# Patient Record
Sex: FEMALE | Race: WHITE | NOT HISPANIC OR LATINO | Employment: FULL TIME | URBAN - METROPOLITAN AREA
[De-identification: names, ages, dates, MRNs, and addresses within clinical notes are randomized per-mention and may not be internally consistent; named-entity substitution may affect disease eponyms.]

---

## 2018-01-09 ENCOUNTER — HOSPITAL ENCOUNTER (EMERGENCY)
Facility: HOSPITAL | Age: 33
End: 2018-01-09
Attending: EMERGENCY MEDICINE | Admitting: EMERGENCY MEDICINE
Payer: COMMERCIAL

## 2018-01-09 DIAGNOSIS — F32.A DEPRESSION: Primary | ICD-10-CM

## 2018-01-09 LAB
AMPHETAMINES SERPL QL SCN: NEGATIVE
ANION GAP SERPL CALCULATED.3IONS-SCNC: 5 MMOL/L (ref 4–13)
APAP SERPL-MCNC: <2 UG/ML (ref 10–30)
BARBITURATES UR QL: NEGATIVE
BASOPHILS # BLD AUTO: 0.05 THOUSANDS/ΜL (ref 0–0.1)
BASOPHILS NFR BLD AUTO: 1 % (ref 0–1)
BENZODIAZ UR QL: NEGATIVE
BUN SERPL-MCNC: 8 MG/DL (ref 5–25)
CALCIUM SERPL-MCNC: 8.9 MG/DL (ref 8.3–10.1)
CHLORIDE SERPL-SCNC: 106 MMOL/L (ref 100–108)
CO2 SERPL-SCNC: 30 MMOL/L (ref 21–32)
COCAINE UR QL: NEGATIVE
CREAT SERPL-MCNC: 0.65 MG/DL (ref 0.6–1.3)
EOSINOPHIL # BLD AUTO: 0.22 THOUSAND/ΜL (ref 0–0.61)
EOSINOPHIL NFR BLD AUTO: 3 % (ref 0–6)
ERYTHROCYTE [DISTWIDTH] IN BLOOD BY AUTOMATED COUNT: 12.9 % (ref 11.6–15.1)
ETHANOL SERPL-MCNC: <3 MG/DL (ref 0–3)
EXT PREG TEST URINE: NEGATIVE
GFR SERPL CREATININE-BSD FRML MDRD: 117 ML/MIN/1.73SQ M
GLUCOSE SERPL-MCNC: 86 MG/DL (ref 65–140)
HCT VFR BLD AUTO: 41 % (ref 34.8–46.1)
HGB BLD-MCNC: 13.7 G/DL (ref 11.5–15.4)
LYMPHOCYTES # BLD AUTO: 3.57 THOUSANDS/ΜL (ref 0.6–4.47)
LYMPHOCYTES NFR BLD AUTO: 41 % (ref 14–44)
MCH RBC QN AUTO: 27.5 PG (ref 26.8–34.3)
MCHC RBC AUTO-ENTMCNC: 33.4 G/DL (ref 31.4–37.4)
MCV RBC AUTO: 82 FL (ref 82–98)
METHADONE UR QL: NEGATIVE
MONOCYTES # BLD AUTO: 0.55 THOUSAND/ΜL (ref 0.17–1.22)
MONOCYTES NFR BLD AUTO: 6 % (ref 4–12)
NEUTROPHILS # BLD AUTO: 4.25 THOUSANDS/ΜL (ref 1.85–7.62)
NEUTS SEG NFR BLD AUTO: 49 % (ref 43–75)
OPIATES UR QL SCN: NEGATIVE
PCP UR QL: NEGATIVE
PLATELET # BLD AUTO: 411 THOUSANDS/UL (ref 149–390)
PMV BLD AUTO: 9.6 FL (ref 8.9–12.7)
POTASSIUM SERPL-SCNC: 4.5 MMOL/L (ref 3.5–5.3)
RBC # BLD AUTO: 4.98 MILLION/UL (ref 3.81–5.12)
SALICYLATES SERPL-MCNC: <3 MG/DL (ref 3–20)
SODIUM SERPL-SCNC: 141 MMOL/L (ref 136–145)
THC UR QL: NEGATIVE
WBC # BLD AUTO: 8.64 THOUSAND/UL (ref 4.31–10.16)

## 2018-01-09 PROCEDURE — 80320 DRUG SCREEN QUANTALCOHOLS: CPT | Performed by: PHYSICIAN ASSISTANT

## 2018-01-09 PROCEDURE — 36415 COLL VENOUS BLD VENIPUNCTURE: CPT | Performed by: PHYSICIAN ASSISTANT

## 2018-01-09 PROCEDURE — 80307 DRUG TEST PRSMV CHEM ANLYZR: CPT | Performed by: PHYSICIAN ASSISTANT

## 2018-01-09 PROCEDURE — 80048 BASIC METABOLIC PNL TOTAL CA: CPT | Performed by: PHYSICIAN ASSISTANT

## 2018-01-09 PROCEDURE — 99285 EMERGENCY DEPT VISIT HI MDM: CPT

## 2018-01-09 PROCEDURE — 85025 COMPLETE CBC W/AUTO DIFF WBC: CPT | Performed by: PHYSICIAN ASSISTANT

## 2018-01-09 PROCEDURE — 81025 URINE PREGNANCY TEST: CPT | Performed by: PHYSICIAN ASSISTANT

## 2018-01-09 PROCEDURE — 80329 ANALGESICS NON-OPIOID 1 OR 2: CPT | Performed by: PHYSICIAN ASSISTANT

## 2018-01-09 RX ORDER — CITALOPRAM 40 MG/1
60 TABLET ORAL DAILY
COMMUNITY
End: 2021-08-17 | Stop reason: ALTCHOICE

## 2018-01-09 NOTE — ED PROVIDER NOTES
History  Chief Complaint   Patient presents with    Psychiatric Evaluation     Patient presents with increased depression since the holidays  Pt states she just wants to sleep and not do anything  Patient states she sleeps all the time and can't eat  pt did not go to work yesterday or today  Pt states she doesn't have suicidal ideations but doesn't trust herself  Pt lives with her  and three children  He states she has to do everything at home and works full time  Pt has never had  an admission for psych     35 yo female presents for evaluation of depression  Pt has long history of such  Per mom, depression has been present since she was 23 yo  She has had multiple therapists in the past  She has never been hospitalized for the depression  No suicidal ideation, no history of such  No homicidal ideation  Denies delusions or hallucinations  Denies IV drug abuse or alcohol abuse  Currently states she just doesn't want to do anything  She does not want to eat, go to the bathroom, nothing  Prior to Admission Medications   Prescriptions Last Dose Informant Patient Reported? Taking?   citalopram (CeleXA) 40 mg tablet   Yes Yes   Sig: Take 60 mg by mouth daily      Facility-Administered Medications: None       Past Medical History:   Diagnosis Date    Psychiatric disorder     depression       History reviewed  No pertinent surgical history  History reviewed  No pertinent family history  I have reviewed and agree with the history as documented  Social History   Substance Use Topics    Smoking status: Never Smoker    Smokeless tobacco: Never Used    Alcohol use Yes        Review of Systems   Constitutional: Negative for activity change, appetite change, chills, fatigue and fever  HENT: Negative for congestion, postnasal drip, rhinorrhea, sore throat, trouble swallowing and voice change  Eyes: Negative for photophobia and visual disturbance     Respiratory: Negative for cough and shortness of breath  Cardiovascular: Negative for chest pain  Gastrointestinal: Negative for abdominal pain, constipation, diarrhea, nausea and vomiting  Endocrine: Negative for cold intolerance and heat intolerance  Genitourinary: Negative for dysuria, flank pain, frequency, hematuria and urgency  Musculoskeletal: Negative for back pain, gait problem, neck pain and neck stiffness  Skin: Negative for rash and wound  Allergic/Immunologic: Negative for food allergies  Neurological: Negative for dizziness, weakness, light-headedness, numbness and headaches  Hematological: Negative for adenopathy  Psychiatric/Behavioral: Negative for agitation, behavioral problems, confusion, hallucinations, self-injury, sleep disturbance and suicidal ideas  The patient is not nervous/anxious  Physical Exam  ED Triage Vitals   Temperature Pulse Respirations Blood Pressure SpO2   01/09/18 1600 01/09/18 1605 01/09/18 1600 01/09/18 1605 01/09/18 1605   97 6 °F (36 4 °C) 97 20 143/90 98 %      Temp Source Heart Rate Source Patient Position - Orthostatic VS BP Location FiO2 (%)   01/09/18 2345 01/09/18 2345 -- 01/09/18 1605 --   Temporal Monitor  Right arm       Pain Score       --                  Orthostatic Vital Signs  Vitals:    01/09/18 1605 01/09/18 2204 01/09/18 2345   BP: 143/90 126/76    Pulse: 97 74 76       Physical Exam   Constitutional: She is oriented to person, place, and time  She appears well-developed and well-nourished  No distress  HENT:   Head: Normocephalic and atraumatic  Eyes: Conjunctivae and EOM are normal  Pupils are equal, round, and reactive to light  Neck: Normal range of motion  Neck supple  Cardiovascular: Normal rate, regular rhythm, normal heart sounds and intact distal pulses  Exam reveals no gallop and no friction rub  No murmur heard  Pulmonary/Chest: Effort normal and breath sounds normal  No respiratory distress  She has no wheezes  She has no rales  Musculoskeletal: Normal range of motion  She exhibits no edema, tenderness or deformity  Neurological: She is alert and oriented to person, place, and time  Skin: Skin is warm and dry  She is not diaphoretic  No pallor  Psychiatric: Judgment and thought content normal  Her speech is delayed  She is withdrawn  She is not actively hallucinating  Thought content is not paranoid and not delusional  Cognition and memory are normal  She exhibits a depressed mood  She expresses no homicidal and no suicidal ideation  She expresses no suicidal plans and no homicidal plans  Vitals reviewed  ED Medications  Medications - No data to display    Diagnostic Studies  Results Reviewed     Procedure Component Value Units Date/Time    Rapid drug screen, urine [[id number]]  (Normal) Collected:  01/09/18 1927    Lab Status:  Final result Specimen:  Urine from Urine, Clean Catch Updated:  01/09/18 2005     Amph/Meth UR Negative     Barbiturate Ur Negative     Benzodiazepine Urine Negative     Cocaine Urine Negative     Methadone Urine Negative     Opiate Urine Negative     PCP Ur Negative     THC Urine Negative    Narrative:         FOR MEDICAL PURPOSES ONLY  IF CONFIRMATION NEEDED PLEASE CONTACT THE LAB WITHIN 5 DAYS      Drug Screen Cutoff Levels:  AMPHETAMINE/METHAMPHETAMINES  1000 ng/mL  BARBITURATES     200 ng/mL  BENZODIAZEPINES     200 ng/mL  COCAINE      300 ng/mL  METHADONE      300 ng/mL  OPIATES      300 ng/mL  PHENCYCLIDINE     25 ng/mL  THC       50 ng/mL    POCT pregnancy, urine [[id number]]  (Normal) Resulted:  01/09/18 1930    Lab Status:  Final result Updated:  01/09/18 1930     EXT PREG TEST UR (Ref: Negative) NEGATIVE    Ethanol [[id number]]  (Normal) Collected:  01/09/18 1718    Lab Status:  Final result Specimen:  Blood from Arm, Right Updated:  01/09/18 1756     Ethanol Lvl <3 mg/dL     Acetaminophen level [[id number]]  (Abnormal) Collected:  01/09/18 1718    Lab Status:  Final result Specimen:  Blood from Arm, Right Updated:  01/09/18 1754     Acetaminophen Level <2 (L) ug/mL     Basic metabolic panel [[id number]] Collected:  01/09/18 1718    Lab Status:  Final result Specimen:  Blood from Arm, Right Updated:  01/09/18 1750     Sodium 141 mmol/L      Potassium 4 5 mmol/L      Chloride 106 mmol/L      CO2 30 mmol/L      Anion Gap 5 mmol/L      BUN 8 mg/dL      Creatinine 0 65 mg/dL      Glucose 86 mg/dL      Calcium 8 9 mg/dL      eGFR 117 ml/min/1 73sq m     Narrative:         National Kidney Disease Education Program recommendations are as follows:  GFR calculation is accurate only with a steady state creatinine  Chronic Kidney disease less than 60 ml/min/1 73 sq  meters  Kidney failure less than 15 ml/min/1 73 sq  meters  Salicylate level [[id number]]  (Abnormal) Collected:  01/09/18 1718    Lab Status:  Final result Specimen:  Blood from Arm, Right Updated:  82/92/03 7325     Salicylate Lvl <3 (L) mg/dL     CBC and differential [[id number]]  (Abnormal) Collected:  01/09/18 1718    Lab Status:  Final result Specimen:  Blood from Arm, Right Updated:  01/09/18 1737     WBC 8 64 Thousand/uL      RBC 4 98 Million/uL      Hemoglobin 13 7 g/dL      Hematocrit 41 0 %      MCV 82 fL      MCH 27 5 pg      MCHC 33 4 g/dL      RDW 12 9 %      MPV 9 6 fL      Platelets 336 (H) Thousands/uL      Neutrophils Relative 49 %      Lymphocytes Relative 41 %      Monocytes Relative 6 %      Eosinophils Relative 3 %      Basophils Relative 1 %      Neutrophils Absolute 4 25 Thousands/µL      Lymphocytes Absolute 3 57 Thousands/µL      Monocytes Absolute 0 55 Thousand/µL      Eosinophils Absolute 0 22 Thousand/µL      Basophils Absolute 0 05 Thousands/µL                  No orders to display              Procedures  Procedures       Phone Contacts  ED Phone Contact    ED Course  ED Course as of Jan 11 1209   Tue Jan 09, 2018   1853 Crisis called   To evaluate pt    2128 Pt signed 201                                MDM  Number of Diagnoses or Management Options  Depression:   Diagnosis management comments: Pt here for severe depression  No SI, HI  Labs obtained  Crisis paged  Pt to sign 201  Pending transfer to [facility] Sheron Ortega  Pt signed out to Dr Tawana Funk at 22:00 on 1/9/18  Amount and/or Complexity of Data Reviewed  Clinical lab tests: ordered and reviewed      CritCare Time    Disposition  Final diagnoses:   Depression     Time reflects when diagnosis was documented in both MDM as applicable and the Disposition within this note     Time User Action Codes Description Comment    1/9/2018 10:02 PM Wilder Jiménez Add [F32 9] Depression       ED Disposition     ED Disposition Condition Comment    Transfer to Another [facility] 69 should be transferred out to [facility] Sheron Ortega MD Documentation    Flowsheet Row Most Recent Value   Accepting Physician    [facility], [address] [place] [address]    (Name & Tel number)  Thanh Lezama (Dot Russo) [phone]   Transported by (Company and Unit #)  [employer] [facility]      RN Documentation    Flowsheet Row Most Recent Value   Accepting Facility Name, [address] [place] [address]   Bed Assignment  [address] [facility]    (Name & Tel number)  Thanh Lezama (Dot Russo) [phone]   Report Given to  [facility] (ADMISSIONS)    Medications Reviewed with Next Provider of Service  Yes   Transport Mode  Ambulance [SLETS]   Transported by (Company and Unit #)  SLETS   Level of Care  Basic life support [SLETS]   Copies of Medical Records Sent  History and Physical, Orders, Progress note, Transfer form, Nursing note, Labs   Patient Belongings Disposition  Sent with patient      Follow-up Information    None       Discharge Medication List as of 1/10/2018 12:02 AM      CONTINUE these medications which have NOT CHANGED    Details   citalopram (CeleXA) 40 mg tablet Take 60 mg by mouth daily, Historical Med           No discharge procedures on file      ED Provider  Electronically Signed by           Smooth Gonzalez PA-C  01/11/18 6967

## 2018-01-09 NOTE — ED NOTES
Pt still unable to provide urine sample   Provided with PO fluids     Vitor Casillas RN  01/09/18 6136

## 2018-01-10 VITALS
SYSTOLIC BLOOD PRESSURE: 126 MMHG | OXYGEN SATURATION: 94 % | TEMPERATURE: 97.7 F | RESPIRATION RATE: 20 BRPM | BODY MASS INDEX: 31.07 KG/M2 | HEIGHT: 64 IN | WEIGHT: 182 LBS | HEART RATE: 76 BPM | DIASTOLIC BLOOD PRESSURE: 76 MMHG

## 2018-01-10 NOTE — ED NOTES
CW received a call from MARLENY MCMANUS  [facility] (admissions) with [address] [place] [address] who informed me that pt has been accepted  Pt will be transferred and admitted to [address] [place] [address] under the care of Dr Yury Smallwood  Transportation has been arranged  CW called MARGARET and spoke with Tanvi Diaz (dispatch) who was able to schedule a  time for 01/10/2018 @0030  CW called [facility] Andino and notified them of this  time        [address] [place] [address] Worker

## 2018-01-10 NOTE — ED NOTES
CW called pt insurance [employer] [phone] which has a out going message stating  that they (Pre-Cert)  are closed and will reopen at 0830 EST        [address] [place] [address]

## 2018-01-10 NOTE — ED NOTES
Pt is a 34 y o  female who was brought to the ED with   Chief Complaint   Patient presents with   Efren Smallwood Psychiatric Evaluation     Patient presents with increased depression since the holidays  Pt states she just wants to sleep and not do anything  Patient states she sleeps all the time and can't eat  pt did not go to work yesterday or today  Pt states she doesn't have suicidal ideations but doesn't trust herself  Pt lives with her  and three children  He states she has to do everything at home and works full time  Pt has never had  an admission for psych   pt brought to the ED by her mother with complaints increased depression, pt reports S/I with plan for the past day , Pt is unable to state her plan  pt reports that she has not left the house in the past 3 days, Pt reports  feeling depressed for the past month, Pt reports feeling hopeless and helpless, tearful  Pt admits to S/I with plan , Pt denies H/I,A/H,V/H  Intake Assessment completed, Safety risk Assessment completed, CW spoke with pt via phone, Marlon Leroy discussed to the process of admission to in patient [facility] , Pt is agreeable to admission, and will sign 201  CW discussed this case  ED Physician who is in agreement with this admission  CW will start bed search and complete Pre-Cert          [facility] Worker

## 2018-01-10 NOTE — ED NOTES
CW returned page and spoke to [facility], [address] [place] [address]  01/09/18 0931

## 2018-01-10 NOTE — EMTALA/ACUTE CARE TRANSFER
[address] [facility] [id number]  Dept: [phone]      EMTALA TRANSFER CONSENT    NAME Corinne Garcia                                         1985                              MRN [id number]    I have been informed of my rights regarding examination, treatment, and transfer   by Dr Justyn Cassidy DO    Benefits:      Risks:        Consent for Transfer:  I acknowledge that my medical condition has been evaluated and explained to me by the emergency department physician or other qualified medical person and/or my attending physician, who has recommended that I be transferred to the service of  Accepting Physician: DR Andi Blanton at [address] [place] [address] Name, [address] : [facility] [facility] [address], [place]  The above potential benefits of such transfer, the potential risks associated with such transfer, and the probable risks of not being transferred have been explained to me, and I fully understand them  The doctor has explained that, in my case, the benefits of transfer outweigh the risks  I agree to be transferred  I authorize the performance of emergency medical procedures and treatments upon me in both transit and upon arrival at the receiving facility  Additionally, I authorize the release of any and all medical records to the receiving facility and request they be transported with me, if possible  I understand that the safest mode of transportation during a medical emergency is an ambulance and that the Hospital advocates the use of this mode of transport  Risks of traveling to the receiving facility by car, including absence of medical control, life sustaining equipment, such as oxygen, and medical personnel has been explained to me and I fully understand them  (WILBERTO CORRECT BOX BELOW)  [X]  I consent to the stated transfer and to be transported by ambulance/helicopter    [  ]  I consent to the stated transfer, but refuse transportation by ambulance and accept full responsibility for my transportation by car  I understand the risks of non-ambulance transfers and I exonerate the Hospital and its staff from any deterioration in my condition that results from this refusal     X___________________________________________    DATE  18  TIME________  Signature of patient or legally responsible individual signing on patient behalf           RELATIONSHIP TO PATIENT_________________________          Provider Certification    NAME Enzo Jay                                         1985                              MRN [id number]    A medical screening exam was performed on the above named patient  Based on the examination:    Condition Necessitating Transfer The encounter diagnosis was Depression  Patient Condition:      Reason for Transfer:      Transfer Requirements: [address] [place] [address]   · Space available and qualified personnel available for treatment as acknowledged by Douglas Juarez ([address] [place] [address]) [phone]  · Agreed to accept transfer and to provide appropriate medical treatment as acknowledged by       DR MENDEZ  · Appropriate medical records of the examination and treatment of the patient are provided at the time of transfer   [address] [place] [address], [address] _______  · Transfer will be performed by qualified personnel from [facility]  and appropriate transfer equipment as required, including the use of necessary and appropriate life support measures      Provider Certification: I have examined the patient and explained the following risks and benefits of being transferred/refusing transfer to the patient/family:         Based on these reasonable risks and benefits to the patient and/or the unborn child(isreal), and based upon the information available at the time of the patients examination, I certify that the medical benefits reasonably to be expected from the provision of appropriate medical treatments at another medical facility outweigh the increasing risks, if any, to the individuals medical condition, and in the case of labor to the unborn child, from effecting the transfer      X____________________________________________ DATE 01/09/18        TIME_______      ORIGINAL - SEND TO MEDICAL RECORDS   COPY - SEND WITH PATIENT DURING TRANSFER

## 2018-01-10 NOTE — ED NOTES
CW started bed search, CW called [facility] and spoke with  Bakari Ba (admissions) who informed me to fax clinical; Clinical has been faxed          [address] [place] [address]

## 2021-08-17 ENCOUNTER — OFFICE VISIT (OUTPATIENT)
Dept: OBGYN CLINIC | Facility: CLINIC | Age: 36
End: 2021-08-17
Payer: COMMERCIAL

## 2021-08-17 ENCOUNTER — VBI (OUTPATIENT)
Dept: ADMINISTRATIVE | Facility: OTHER | Age: 36
End: 2021-08-17

## 2021-08-17 VITALS
WEIGHT: 202.6 LBS | DIASTOLIC BLOOD PRESSURE: 88 MMHG | HEIGHT: 64 IN | SYSTOLIC BLOOD PRESSURE: 138 MMHG | BODY MASS INDEX: 34.59 KG/M2

## 2021-08-17 DIAGNOSIS — R30.0 DYSURIA: Primary | ICD-10-CM

## 2021-08-17 LAB
SL AMB  POCT GLUCOSE, UA: NEGATIVE
SL AMB LEUKOCYTE ESTERASE,UA: ABNORMAL
SL AMB POCT BILIRUBIN,UA: NEGATIVE
SL AMB POCT BLOOD,UA: ABNORMAL
SL AMB POCT CLARITY,UA: CLEAR
SL AMB POCT COLOR,UA: YELLOW
SL AMB POCT KETONES,UA: NEGATIVE
SL AMB POCT NITRITE,UA: NEGATIVE
SL AMB POCT PH,UA: 5
SL AMB POCT SPECIFIC GRAVITY,UA: 1.01
SL AMB POCT URINE PROTEIN: NEGATIVE
SL AMB POCT UROBILINOGEN: 0.2

## 2021-08-17 PROCEDURE — 99212 OFFICE O/P EST SF 10 MIN: CPT | Performed by: OBSTETRICS & GYNECOLOGY

## 2021-08-17 PROCEDURE — 81002 URINALYSIS NONAUTO W/O SCOPE: CPT | Performed by: OBSTETRICS & GYNECOLOGY

## 2021-08-17 RX ORDER — VENLAFAXINE HYDROCHLORIDE 37.5 MG/1
1 CAPSULE, EXTENDED RELEASE ORAL DAILY
COMMUNITY
Start: 2021-07-20

## 2021-08-17 RX ORDER — VENLAFAXINE HYDROCHLORIDE 150 MG/1
150 CAPSULE, EXTENDED RELEASE ORAL DAILY
COMMUNITY

## 2021-08-17 RX ORDER — TRAZODONE HYDROCHLORIDE 50 MG/1
50 TABLET ORAL
COMMUNITY

## 2021-08-17 RX ORDER — CLONAZEPAM 0.25 MG/1
0.25 TABLET, ORALLY DISINTEGRATING ORAL AS NEEDED
COMMUNITY

## 2021-08-17 RX ORDER — NITROFURANTOIN 25; 75 MG/1; MG/1
100 CAPSULE ORAL 2 TIMES DAILY
Qty: 14 CAPSULE | Refills: 0 | Status: SHIPPED | OUTPATIENT
Start: 2021-08-17 | End: 2021-08-24

## 2021-08-17 NOTE — TELEPHONE ENCOUNTER
Upon review of the In Basket request we were able to locate, review, and update the patient chart as requested for Pap Smear (HPV) aka Cervical Cancer Screening  Any additional questions or concerns should be emailed to the Practice Liaisons via [email or web address] email, please do not reply via In Basket      Thank you  Evelyn Knowles

## 2021-08-20 LAB
APPEARANCE UR: ABNORMAL
BACTERIA UR QL AUTO: ABNORMAL /HPF
BILIRUB UR QL STRIP: NEGATIVE
COLOR UR: YELLOW
GLUCOSE UR QL STRIP: NEGATIVE
HGB UR QL STRIP: NEGATIVE
HYALINE CASTS #/AREA URNS LPF: ABNORMAL /LPF
KETONES UR QL STRIP: NEGATIVE
LEUKOCYTE ESTERASE UR QL STRIP: ABNORMAL
NITRITE UR QL STRIP: NEGATIVE
PH UR STRIP: 7.5 [PH] (ref 5–8)
PROT UR QL STRIP: NEGATIVE
RBC #/AREA URNS HPF: ABNORMAL /HPF
SP GR UR STRIP: 1.01 (ref 1–1.03)
SQUAMOUS #/AREA URNS HPF: ABNORMAL /HPF
WBC #/AREA URNS HPF: ABNORMAL /HPF

## 2021-10-08 PROBLEM — R30.0 DYSURIA: Status: ACTIVE | Noted: 2021-08-17

## 2021-10-08 PROBLEM — R30.0 DYSURIA: Status: ACTIVE | Noted: 2021-10-08

## 2021-12-03 ENCOUNTER — TELEPHONE (OUTPATIENT)
Dept: OBGYN CLINIC | Facility: CLINIC | Age: 36
End: 2021-12-03